# Patient Record
Sex: FEMALE | URBAN - METROPOLITAN AREA
[De-identification: names, ages, dates, MRNs, and addresses within clinical notes are randomized per-mention and may not be internally consistent; named-entity substitution may affect disease eponyms.]

---

## 2020-02-06 ENCOUNTER — OFFICE VISIT (OUTPATIENT)
Dept: URBAN - METROPOLITAN AREA CLINIC 52 | Facility: CLINIC | Age: 80
End: 2020-02-06
Payer: MEDICARE

## 2020-02-06 DIAGNOSIS — H40.1111 PRIMARY OPEN-ANGLE GLAUCOMA, RIGHT EYE, MILD STAGE: ICD-10-CM

## 2020-02-06 DIAGNOSIS — H52.4 PRESBYOPIA: ICD-10-CM

## 2020-02-06 PROCEDURE — 99213 OFFICE O/P EST LOW 20 MIN: CPT | Performed by: OPHTHALMOLOGY

## 2020-02-06 ASSESSMENT — INTRAOCULAR PRESSURE
OD: 14
OS: 12
OD: 18
OS: 14

## 2020-02-06 NOTE — IMPRESSION/PLAN
Impression: Primary open-angle glaucoma, left eye, severe stage: Y90.3993.  Plan: Pt VF shows some worsening, will repeat in April, if results are similar or worse will discuss changing gtts and/or seeing the glaucoma specialist. Pt to continue with Lumigan 1 gtt qhs OS and Combigan 1 gtt bid OU

## 2020-04-15 ENCOUNTER — OFFICE VISIT (OUTPATIENT)
Dept: URBAN - METROPOLITAN AREA CLINIC 52 | Facility: CLINIC | Age: 80
End: 2020-04-15
Payer: MEDICARE

## 2020-04-15 PROCEDURE — 92083 EXTENDED VISUAL FIELD XM: CPT | Performed by: OPTOMETRIST

## 2020-04-15 PROCEDURE — 92133 CPTRZD OPH DX IMG PST SGM ON: CPT | Performed by: OPTOMETRIST

## 2020-04-15 PROCEDURE — 92002 INTRM OPH EXAM NEW PATIENT: CPT | Performed by: OPTOMETRIST

## 2020-04-15 RX ORDER — NETARSUDIL AND LATANOPROST OPHTHALMIC SOLUTION, 0.02%/0.005% .2; .05 MG/ML; MG/ML
SOLUTION/ DROPS OPHTHALMIC; TOPICAL
Qty: 2.5 | Refills: 6 | Status: INACTIVE
Start: 2020-04-15 | End: 2021-02-19

## 2020-04-15 ASSESSMENT — INTRAOCULAR PRESSURE
OS: 13
OD: 15

## 2020-04-15 NOTE — IMPRESSION/PLAN
Impression: Primary open-angle glaucoma, left eye, severe stage: L07.2809. Plan: vf and oct shows progression. Continue Combigan bid ou, dc Lumigan and switch to Rocklatan q hs OS. Discussed SLT/micropulse.  COnsult with Dr. Tayo Peterson since VF progressed in 6 mos

## 2020-04-15 NOTE — IMPRESSION/PLAN
Impression: Primary open-angle glaucoma, right eye, mild stage: H40.1111.  Plan: continue Combigan bid ou

## 2020-04-30 ENCOUNTER — OFFICE VISIT (OUTPATIENT)
Dept: URBAN - METROPOLITAN AREA CLINIC 45 | Facility: CLINIC | Age: 80
End: 2020-04-30
Payer: MEDICARE

## 2020-04-30 DIAGNOSIS — H40.2212 CHRONIC ANGLE-CLOSURE GLAUCOMA, RIGHT EYE, MODERATE STAGE: ICD-10-CM

## 2020-04-30 PROCEDURE — 92020 GONIOSCOPY: CPT | Performed by: OPHTHALMOLOGY

## 2020-04-30 PROCEDURE — 92012 INTRM OPH EXAM EST PATIENT: CPT | Performed by: OPHTHALMOLOGY

## 2020-04-30 PROCEDURE — 76514 ECHO EXAM OF EYE THICKNESS: CPT | Performed by: OPHTHALMOLOGY

## 2020-04-30 RX ORDER — PREDNISOLONE ACETATE 10 MG/ML
1 % SUSPENSION/ DROPS OPHTHALMIC
Qty: 1 | Refills: 0 | Status: INACTIVE
Start: 2020-04-30 | End: 2020-08-27

## 2020-04-30 ASSESSMENT — INTRAOCULAR PRESSURE
OS: 11
OD: 10

## 2020-04-30 NOTE — IMPRESSION/PLAN
Impression: Chronic angle-closure glaucoma, left eye, severe stage: L97.3092. Goal IOP 12 and below, /522, 4/20 OCT 66/61 9/7, 4/20 HVF OD SNS OS DSA Plan: Discussed diagnosis with patient. Discussed treatment options. Recommend patient continue Rocklatan QHS OS and Combigan BID OU.  ok for LPI OU in ASC, RL2. Discussed narrow angles, risk of angle closure, symptoms of AACG and Laser peripheral iridotomy (LPI) risk/benefits/alternatives. Discussed that pt should avoid dilation and anti-depression, anti-anxiety, anti-histamine, or other medications contraindicated in angle closure glaucoma until the laser has been performed. Discussed risk of irreversible vision loss with glaucoma. Pt voiced understanding.

## 2020-05-18 ENCOUNTER — SURGERY (OUTPATIENT)
Dept: URBAN - METROPOLITAN AREA SURGERY 20 | Facility: SURGERY | Age: 80
End: 2020-05-18
Payer: MEDICARE

## 2020-05-26 ENCOUNTER — POST-OPERATIVE VISIT (OUTPATIENT)
Dept: URBAN - METROPOLITAN AREA CLINIC 45 | Facility: CLINIC | Age: 80
End: 2020-05-26

## 2020-05-26 DIAGNOSIS — Z09 ENCNTR FOR F/U EXAM AFT TRTMT FOR COND OTH THAN MALIG NEOPLM: Primary | ICD-10-CM

## 2020-05-26 PROCEDURE — 99024 POSTOP FOLLOW-UP VISIT: CPT | Performed by: OPTOMETRIST

## 2020-05-26 ASSESSMENT — INTRAOCULAR PRESSURE
OD: 12
OS: 10

## 2020-07-21 ENCOUNTER — OFFICE VISIT (OUTPATIENT)
Dept: URBAN - METROPOLITAN AREA CLINIC 52 | Facility: CLINIC | Age: 80
End: 2020-07-21
Payer: MEDICARE

## 2020-07-21 PROCEDURE — 99213 OFFICE O/P EST LOW 20 MIN: CPT | Performed by: OPHTHALMOLOGY

## 2020-07-21 ASSESSMENT — INTRAOCULAR PRESSURE
OS: 21
OD: 11
OD: 19
OS: 11

## 2020-07-21 NOTE — IMPRESSION/PLAN
Impression: Chronic angle-closure glaucoma, right eye, moderate stage: R49.3484. S/p LPI Plan: IOP is adequate will have pt continue with current regimen.

## 2020-07-21 NOTE — IMPRESSION/PLAN
Impression: Dry eye syndrome of bilateral lacrimal glands: H04.123. Plan: Recommend pt to use PF AFT during the day, recommended Refresh with Brandon pt denied samples, does not want to use something new. Also recommended pt to use genteal gel gtts QHS OU.

## 2020-07-21 NOTE — IMPRESSION/PLAN
Impression: Chronic angle-closure glaucoma, left eye, severe stage: Z65.4916. S/p LPI Plan: Pt is doing well with current regimen, IOP may be adequate, and pt is compliant. Recommend pt to continue with gtts. Explained to pt that irritation is not likely from Loletha Cola likely from dryness, as pt on Rx since April. Pt to continue with Rocklatan 1 gtt qhs OS and Combigan bid OU.   Will re-check VF in Nov, and not wait one year, due to spl heme

## 2020-08-27 ENCOUNTER — OFFICE VISIT (OUTPATIENT)
Dept: URBAN - METROPOLITAN AREA CLINIC 52 | Facility: CLINIC | Age: 80
End: 2020-08-27
Payer: MEDICARE

## 2020-08-27 DIAGNOSIS — H25.812 COMBINED FORMS OF AGE-RELATED CATARACT, LEFT EYE: ICD-10-CM

## 2020-08-27 DIAGNOSIS — H04.123 DRY EYE SYNDROME OF BILATERAL LACRIMAL GLANDS: ICD-10-CM

## 2020-08-27 DIAGNOSIS — H40.2223 CHRONIC ANGLE-CLOSURE GLAUCOMA, LEFT EYE, SEVERE STAGE: Primary | ICD-10-CM

## 2020-08-27 PROCEDURE — 99213 OFFICE O/P EST LOW 20 MIN: CPT | Performed by: OPHTHALMOLOGY

## 2020-08-27 ASSESSMENT — INTRAOCULAR PRESSURE
OS: 9
OS: 12
OD: 12
OD: 11

## 2020-08-27 NOTE — IMPRESSION/PLAN
Impression: Chronic angle-closure glaucoma, left eye, severe stage: W83.8637. S/p LPI OU 
IOP 12 9   CCT  534 522 Plan: IOP is at Target, discussed alternative regimens, recommend current regimen. Combigan 1 gtt bid OU and Rocklatan 1 gtt QHS OS. Also recommend pt to continue with AFT in between gtts (5-10 minutes) for comfort.

## 2020-08-27 NOTE — IMPRESSION/PLAN
Impression: Chronic angle-closure glaucoma, right eye, moderate stage: S28.8978. 
S/p LPI OU Plan: See plan 1

## 2020-08-27 NOTE — IMPRESSION/PLAN
Impression: Dry eye syndrome of bilateral lacrimal glands: H04.123. Plan: Discussed diagnosis in detail with patient. Discussed treatment options with patient. Patient instructed to apply warm compresses. Patient instructed to use artificial tears as needed.

## 2020-11-04 ENCOUNTER — TESTING ONLY (OUTPATIENT)
Dept: URBAN - METROPOLITAN AREA CLINIC 52 | Facility: CLINIC | Age: 80
End: 2020-11-04
Payer: MEDICARE

## 2020-11-04 PROCEDURE — 92083 EXTENDED VISUAL FIELD XM: CPT | Performed by: OPHTHALMOLOGY

## 2020-11-20 ENCOUNTER — OFFICE VISIT (OUTPATIENT)
Dept: URBAN - METROPOLITAN AREA CLINIC 52 | Facility: CLINIC | Age: 80
End: 2020-11-20
Payer: MEDICARE

## 2020-11-20 PROCEDURE — 99213 OFFICE O/P EST LOW 20 MIN: CPT | Performed by: OPHTHALMOLOGY

## 2020-11-20 ASSESSMENT — INTRAOCULAR PRESSURE
OS: 9
OD: 12

## 2020-11-20 NOTE — IMPRESSION/PLAN
Impression: Chronic angle-closure glaucoma, left eye, severe stage: Z77.9899. CCT: 534/522         S/P LPI OU Plan: IOP doing well on Combigan OU BID and Rocklatan OS QHS - continue on current regimen. Will have Dr. Rossi Forward review patient's notes and testing and see if any additional treatment is warranted. Will call patient once we have spoken to Dr. Maren Burns. Patient understands. Patient to call us sooner, onset of new visual changes.

## 2021-02-18 ENCOUNTER — OFFICE VISIT (OUTPATIENT)
Dept: URBAN - METROPOLITAN AREA CLINIC 52 | Facility: CLINIC | Age: 81
End: 2021-02-18
Payer: MEDICARE

## 2021-02-18 DIAGNOSIS — H40.1123 PRIMARY OPEN-ANGLE GLAUCOMA, LEFT EYE, SEVERE STAGE: Primary | ICD-10-CM

## 2021-02-18 DIAGNOSIS — H25.13 AGE-RELATED NUCLEAR CATARACT, BILATERAL: ICD-10-CM

## 2021-02-18 PROCEDURE — 99213 OFFICE O/P EST LOW 20 MIN: CPT | Performed by: OPHTHALMOLOGY

## 2021-02-18 ASSESSMENT — INTRAOCULAR PRESSURE
OS: 8
OS: 9
OD: 10

## 2021-02-19 RX ORDER — NETARSUDIL AND LATANOPROST OPHTHALMIC SOLUTION, 0.02%/0.005% .2; .05 MG/ML; MG/ML
SOLUTION/ DROPS OPHTHALMIC; TOPICAL
Qty: 2.5 | Refills: 6 | Status: INACTIVE
Start: 2021-02-19 | End: 2021-07-15

## 2021-02-19 RX ORDER — BRIMONIDINE TARTRATE, TIMOLOL MALEATE 2; 5 MG/ML; MG/ML
SOLUTION/ DROPS OPHTHALMIC
Qty: 15 | Refills: 3 | Status: INACTIVE
Start: 2021-02-19 | End: 2021-06-03

## 2021-02-19 NOTE — IMPRESSION/PLAN
Impression: Primary open-angle glaucoma, left eye, severe stage: D83.7481. CCT: 534/522         S/P LPI OU Plan: IOP doing well on Combigan OU BID and Rocklatan OS QHS - continue on current regimen. Will continue to monitor IOP closely. Patient to call us sooner, onset of new visual changes. Will dilate patient at next visit.

## 2021-02-19 NOTE — IMPRESSION/PLAN
Impression: Age-related nuclear cataract, bilateral: H25.13. Plan: Cataracts account for patients complaints. Patient desires to hold off on cataract surgery at this time.

## 2021-04-21 ENCOUNTER — TESTING ONLY (OUTPATIENT)
Dept: URBAN - METROPOLITAN AREA CLINIC 52 | Facility: CLINIC | Age: 81
End: 2021-04-21
Payer: MEDICARE

## 2021-04-21 PROCEDURE — 76519 ECHO EXAM OF EYE: CPT | Performed by: OPHTHALMOLOGY

## 2021-04-21 PROCEDURE — 92025 CPTRIZED CORNEAL TOPOGRAPHY: CPT | Performed by: OPHTHALMOLOGY

## 2021-04-21 ASSESSMENT — PACHYMETRY
OS: 2.85
OS: 23.61
OD: 2.90
OD: 23.60

## 2021-04-23 ENCOUNTER — OFFICE VISIT (OUTPATIENT)
Dept: URBAN - METROPOLITAN AREA CLINIC 52 | Facility: CLINIC | Age: 81
End: 2021-04-23
Payer: MEDICARE

## 2021-04-23 PROCEDURE — 99213 OFFICE O/P EST LOW 20 MIN: CPT | Performed by: OPHTHALMOLOGY

## 2021-04-23 RX ORDER — PREDNISOLONE ACETATE 10 MG/ML
1 % SUSPENSION/ DROPS OPHTHALMIC
Qty: 10 | Refills: 1 | Status: INACTIVE
Start: 2021-04-23 | End: 2021-05-05

## 2021-04-23 RX ORDER — KETOROLAC TROMETHAMINE 5 MG/ML
0.5 % SOLUTION/ DROPS OPHTHALMIC
Qty: 5 | Refills: 1 | Status: INACTIVE
Start: 2021-04-23 | End: 2021-05-20

## 2021-04-23 ASSESSMENT — INTRAOCULAR PRESSURE
OD: 12
OS: 9

## 2021-04-23 ASSESSMENT — VISUAL ACUITY
OD: 20/30
OS: 20/40

## 2021-04-23 NOTE — IMPRESSION/PLAN
Impression: Primary open-angle glaucoma, left eye, severe stage: I13.6103. CCT: 534/522         S/P LPI OU Plan: IOP doing well on Combigan OU BID and Rocklatan OS QHS - continue on current regimen. Will monitor after cataract surgery.

## 2021-04-23 NOTE — IMPRESSION/PLAN
Impression: Age-related nuclear cataract, bilateral: H25.13. Plan: OK for ce/iol OS in [facility] 27, RL2. AIM FAR. Cataracts are limiting vision. Discussed risks, benefits and alternatives to surgery including but not limited to: bleeding, infection, risk of vision loss, loss of the eye, need for other surgery. Patient voiced understanding and wishes to proceed. No Dexy secondary to glaucoma status in the left eye. We can do Dexy in the right eye. Will do Moxi injection at the time of surgery. Recommend SA60WF +22. 0

## 2021-05-04 ENCOUNTER — SURGERY (OUTPATIENT)
Dept: URBAN - METROPOLITAN AREA SURGERY 20 | Facility: SURGERY | Age: 81
End: 2021-05-04
Payer: MEDICARE

## 2021-05-04 PROCEDURE — 66984 XCAPSL CTRC RMVL W/O ECP: CPT | Performed by: OPHTHALMOLOGY

## 2021-05-06 ENCOUNTER — POST-OPERATIVE VISIT (OUTPATIENT)
Dept: URBAN - METROPOLITAN AREA CLINIC 52 | Facility: CLINIC | Age: 81
End: 2021-05-06

## 2021-05-06 PROCEDURE — 99024 POSTOP FOLLOW-UP VISIT: CPT | Performed by: OPHTHALMOLOGY

## 2021-05-06 RX ORDER — OFLOXACIN 3 MG/ML
0.3 % SOLUTION/ DROPS OPHTHALMIC
Qty: 5 | Refills: 1 | Status: INACTIVE
Start: 2021-05-06 | End: 2021-05-20

## 2021-05-06 ASSESSMENT — INTRAOCULAR PRESSURE
OS: 17
OS: 14
OD: 20

## 2021-05-06 NOTE — IMPRESSION/PLAN
Impression: S/P Cataract Extraction by phacoemulsification with IOL placement OS - 2 Days. Encounter for surgical aftercare following surgery on a sense organ  Z48.810. Excellent post op course   Post operative instructions reviewed - Condition is improving - Plan: Patient is healing well. Patient to call us sooner with any questions or concerns. Continue Combigan OU BID and Rocklatan OS QHS. --Continue Ofloxacin 0.3%--Continue Ketorolac 0.5%--Taper Prednisolone acetate 1% QID x 1 wk, TID x 1 wk, BID x 1wk, QD x 1wk, then d/c--Advised patient to use artificial tears for comfort.

## 2021-05-13 ENCOUNTER — OFFICE VISIT (OUTPATIENT)
Dept: URBAN - METROPOLITAN AREA CLINIC 52 | Facility: CLINIC | Age: 81
End: 2021-05-13
Payer: MEDICARE

## 2021-05-13 DIAGNOSIS — H25.11 AGE-RELATED NUCLEAR CATARACT, RIGHT EYE: ICD-10-CM

## 2021-05-13 DIAGNOSIS — Z48.810 ENCOUNTER FOR SURGICAL AFTERCARE FOLLOWING SURGERY ON A SENSE ORGAN: Primary | ICD-10-CM

## 2021-05-13 PROCEDURE — 99024 POSTOP FOLLOW-UP VISIT: CPT | Performed by: OPHTHALMOLOGY

## 2021-05-13 ASSESSMENT — INTRAOCULAR PRESSURE
OS: 14
OD: 11
OS: 7

## 2021-05-13 ASSESSMENT — VISUAL ACUITY: OD: 20/30

## 2021-05-13 NOTE — IMPRESSION/PLAN
Impression: S/P Cataract Extraction by phacoemulsification with IOL placement OS - 9 Days. Encounter for surgical aftercare following surgery on a sense organ  Z48.810. Excellent post op course   Post operative instructions reviewed - Condition is improving - Plan: OK for CE/IOL OD  in ASC, RL2. Distance target. Discussed need for glasses for near vision with standard. Discussed diagnosis of cataracts. Cataracts are limiting vision. Discussed risks, benefits and alternatives to surgery including but not limited to: bleeding, infection, risk of vision loss, loss of the eye, need for other surgery. Patient voiced understanding and wishes to proceed. Discussed doing Dexycu at the time of surgery. Use SA60WF +22. Robin Sintia 5 --Advised patient to use artificial tears for comfort.

## 2021-05-18 ENCOUNTER — SURGERY (OUTPATIENT)
Dept: URBAN - METROPOLITAN AREA SURGERY 20 | Facility: SURGERY | Age: 81
End: 2021-05-18
Payer: MEDICARE

## 2021-05-18 PROCEDURE — 66984 XCAPSL CTRC RMVL W/O ECP: CPT | Performed by: OPHTHALMOLOGY

## 2021-05-20 ENCOUNTER — POST-OPERATIVE VISIT (OUTPATIENT)
Dept: URBAN - METROPOLITAN AREA CLINIC 52 | Facility: CLINIC | Age: 81
End: 2021-05-20
Payer: MEDICARE

## 2021-05-20 PROCEDURE — 99024 POSTOP FOLLOW-UP VISIT: CPT | Performed by: OPHTHALMOLOGY

## 2021-05-20 ASSESSMENT — INTRAOCULAR PRESSURE
OS: 8
OD: 10

## 2021-05-25 NOTE — IMPRESSION/PLAN
Impression: S/P Cataract Extraction by phaco w/IOL placement; Dexycu SA60WF 22.50 OD - 2 Days. Presence of intraocular lens  Z96.1. Excellent post op course   Post operative instructions reviewed - Condition is improving - Plan: Patient is healing well. Patient to call us sooner with any questions or concerns. --Advised patient to use artificial tears for comfort.

## 2021-06-03 ENCOUNTER — POST-OPERATIVE VISIT (OUTPATIENT)
Dept: URBAN - METROPOLITAN AREA CLINIC 52 | Facility: CLINIC | Age: 81
End: 2021-06-03
Payer: MEDICARE

## 2021-06-03 PROCEDURE — 99024 POSTOP FOLLOW-UP VISIT: CPT | Performed by: OPHTHALMOLOGY

## 2021-06-03 RX ORDER — BRIMONIDINE TARTRATE, TIMOLOL MALEATE 2; 5 MG/ML; MG/ML
SOLUTION/ DROPS OPHTHALMIC
Qty: 15 | Refills: 3 | Status: INACTIVE
Start: 2021-06-03 | End: 2021-07-15

## 2021-06-03 ASSESSMENT — INTRAOCULAR PRESSURE
OD: 10
OD: 6
OS: 7
OS: 10

## 2021-06-03 NOTE — IMPRESSION/PLAN
Impression: S/P Cataract Extraction by phacoemulsification with IOL placement; Dexycu OD - 16 Days. Presence of intraocular lens  Z96.1. Excellent post op course   Post operative instructions reviewed - Condition is improving - Plan: IOP significantly improved since cataract surgery. Will have patient discontinue Rocklatan and continue with Combigan OU BID as directed. Patient understands and agrees with plan. --Advised patient to use artificial tears for comfort.

## 2021-06-18 ENCOUNTER — POST-OPERATIVE VISIT (OUTPATIENT)
Dept: URBAN - METROPOLITAN AREA CLINIC 52 | Facility: CLINIC | Age: 81
End: 2021-06-18
Payer: MEDICARE

## 2021-06-18 PROCEDURE — 99024 POSTOP FOLLOW-UP VISIT: CPT | Performed by: OPHTHALMOLOGY

## 2021-06-18 ASSESSMENT — INTRAOCULAR PRESSURE
OS: 11
OD: 11

## 2021-06-18 NOTE — IMPRESSION/PLAN
Impression:  Presence of intraocular lens  Z96.1. Excellent post op course   Post operative instructions reviewed - Plan: Discussed, in detail, and reassured patient about diagnosis. Discussed treatment options with patient, though no treatment is required/recommend at this time. Discussed signs and symptoms of PVD/floaters. Discussed signs and symptoms of retinal detachment. Will continue to observe for any changes. Return in 6weeks for a follow up.

## 2021-07-15 ENCOUNTER — POST-OPERATIVE VISIT (OUTPATIENT)
Dept: URBAN - METROPOLITAN AREA CLINIC 52 | Facility: CLINIC | Age: 81
End: 2021-07-15
Payer: MEDICARE

## 2021-07-15 ENCOUNTER — OFFICE VISIT (OUTPATIENT)
Dept: URBAN - METROPOLITAN AREA CLINIC 54 | Facility: CLINIC | Age: 81
End: 2021-07-15
Payer: MEDICARE

## 2021-07-15 DIAGNOSIS — H43.813 VITREOUS DEGENERATION, BILATERAL: Primary | ICD-10-CM

## 2021-07-15 DIAGNOSIS — H40.9 UNSPECIFIED GLAUCOMA: ICD-10-CM

## 2021-07-15 DIAGNOSIS — H43.11 VITREOUS HEMORRHAGE, RIGHT EYE: ICD-10-CM

## 2021-07-15 DIAGNOSIS — Z96.1 PRESENCE OF INTRAOCULAR LENS: Primary | ICD-10-CM

## 2021-07-15 PROCEDURE — 99204 OFFICE O/P NEW MOD 45 MIN: CPT | Performed by: OPHTHALMOLOGY

## 2021-07-15 PROCEDURE — 92201 OPSCPY EXTND RTA DRAW UNI/BI: CPT | Performed by: OPHTHALMOLOGY

## 2021-07-15 PROCEDURE — 99024 POSTOP FOLLOW-UP VISIT: CPT | Performed by: OPHTHALMOLOGY

## 2021-07-15 RX ORDER — DORZOLAMIDE HYDROCHLORIDE AND TIMOLOL MALEATE 20; 5 MG/ML; MG/ML
SOLUTION/ DROPS OPHTHALMIC
Qty: 5 | Refills: 4 | Status: INACTIVE
Start: 2021-07-15 | End: 2021-07-15

## 2021-07-15 ASSESSMENT — INTRAOCULAR PRESSURE
OD: 11
OD: 14
OS: 14
OS: 13
OD: 15
OS: 14
OS: 13
OD: 11
OS: 10
OS: 10
OD: 15
OD: 14

## 2021-07-15 NOTE — IMPRESSION/PLAN
Impression: S/P Cataract Extraction by phacoemulsification with IOL placement; Dexycu OD - 58 Days. Presence of intraocular lens  Z96.1. Excellent post op course   Post operative instructions reviewed - Condition is improving - Plan: Splinter heme noted on her left eye today. Will have patient discontinue Combigan and start Dorzolamide/Timolol OU BID - ERx'd into the pharmacy. Discussed PVD with patient, patient is extremely symptomatic with floaters OU. Discussed retinal heme OD with patient. Will refer patient to retinal specialist for eval. Will have patient make an appointment for next week with [facility]. Patient understands and agrees with plan. --Advised patient to use artificial tears for comfort.

## 2021-07-15 NOTE — IMPRESSION/PLAN
Impression: Vitreous hemorrhage, right eye: H43.11 Right. Plan: Discussed options of surgery vs observation. Patient elects to observe.

## 2021-07-15 NOTE — IMPRESSION/PLAN
Impression: Vitreous degeneration, bilateral: H43.813 Bilateral.

OCT:
OD: wnl OS: wnl Plan: Indirect ophthalmoscopy with scleral depression was performed and no retinal breaks or evidence of detachment were identified. The diagnosis, natural history, and prognosis of PVD were discussed at length. The signs and symptoms of retinal break/detachment including increased flashes, new-onset floaters, and development of a shadow/curtain shade in the visual field were reviewed.  

RTC 6-8 weeks DFE OD reeval

## 2021-07-30 ENCOUNTER — POST-OPERATIVE VISIT (OUTPATIENT)
Dept: URBAN - METROPOLITAN AREA CLINIC 52 | Facility: CLINIC | Age: 81
End: 2021-07-30
Payer: MEDICARE

## 2021-07-30 PROCEDURE — 99024 POSTOP FOLLOW-UP VISIT: CPT | Performed by: OPHTHALMOLOGY

## 2021-07-30 ASSESSMENT — INTRAOCULAR PRESSURE
OD: 10
OS: 10
OD: 12
OS: 13

## 2021-07-30 NOTE — IMPRESSION/PLAN
Impression: S/P Cataract Extraction by phacoemulsification with IOL placement; Dexycu OD - 73 Days. Presence of intraocular lens  Z96.1. Excellent post op course   Post operative instructions reviewed - Condition is improving - Plan: Per patient, floaters are improving. Continue with Cosopt OU BID. --Advised patient to use artificial tears for comfort.

## 2021-11-17 ENCOUNTER — TESTING ONLY (OUTPATIENT)
Dept: URBAN - METROPOLITAN AREA CLINIC 52 | Facility: CLINIC | Age: 81
End: 2021-11-17
Payer: MEDICARE

## 2021-11-17 PROCEDURE — 92083 EXTENDED VISUAL FIELD XM: CPT | Performed by: OPHTHALMOLOGY

## 2021-12-16 ENCOUNTER — OFFICE VISIT (OUTPATIENT)
Dept: URBAN - METROPOLITAN AREA CLINIC 52 | Facility: CLINIC | Age: 81
End: 2021-12-16
Payer: MEDICARE

## 2021-12-16 DIAGNOSIS — H40.1112 PRIMARY OPEN-ANGLE GLAUCOMA, RIGHT EYE, MODERATE STAGE: ICD-10-CM

## 2021-12-16 PROCEDURE — 99213 OFFICE O/P EST LOW 20 MIN: CPT | Performed by: OPHTHALMOLOGY

## 2021-12-16 PROCEDURE — 92083 EXTENDED VISUAL FIELD XM: CPT | Performed by: OPHTHALMOLOGY

## 2021-12-16 ASSESSMENT — INTRAOCULAR PRESSURE
OD: 11
OD: 13
OS: 11
OS: 12

## 2021-12-16 NOTE — IMPRESSION/PLAN
Impression: Primary open-angle glaucoma, left eye, severe stage: C30.9355. CCT: 534/522         S/P LPI OU Plan: IOP doing well on Cosopt OU BID - continue on current regimen. Reviewed VF with patient that was done in November. Will continue to monitor closely. Patient to call us sooner, onset of new visual changes. 

FINAL MEDS: COSOPT OU BID

## 2022-04-20 ENCOUNTER — OFFICE VISIT (OUTPATIENT)
Dept: URBAN - METROPOLITAN AREA CLINIC 52 | Facility: CLINIC | Age: 82
End: 2022-04-20
Payer: MEDICARE

## 2022-04-20 DIAGNOSIS — H40.9 UNSPECIFIED GLAUCOMA: ICD-10-CM

## 2022-04-20 DIAGNOSIS — H04.123 DRY EYE SYNDROME OF BILATERAL LACRIMAL GLANDS: Primary | ICD-10-CM

## 2022-04-20 DIAGNOSIS — H43.813 VITREOUS DEGENERATION, BILATERAL: ICD-10-CM

## 2022-04-20 PROCEDURE — 92134 CPTRZ OPH DX IMG PST SGM RTA: CPT | Performed by: OPTOMETRIST

## 2022-04-20 PROCEDURE — 92014 COMPRE OPH EXAM EST PT 1/>: CPT | Performed by: OPTOMETRIST

## 2022-04-20 ASSESSMENT — INTRAOCULAR PRESSURE
OS: 12
OD: 12
OS: 13

## 2022-04-20 NOTE — IMPRESSION/PLAN
Impression: Dry eye syndrome of bilateral lacrimal glands: H04.123. Plan: Patient has significant PEE OS>OD which likely correlates with patient's visual disturbances. NO evidence of macular edema, macular changes, central PCO, etc. 

Advised patient to increase usage of PFAT to Q2hrs OU throughout the day, WC BID OU 5-10 minutes, and follow with Dr. Darren Su accordingly.

## 2022-04-20 NOTE — IMPRESSION/PLAN
Impression: Unspecified glaucoma: H40.9. Plan: IOP appears well-controlled with Cosopt BID OU. Advised patient to continue gtts, patient scheduled in June with Dr. Cara Lopez, would advise sooner visit given patient's vision changes. Mode rodriguez noted OS today, possible progression of glaucoma. **Unclear whether patient has chronic angle-closure glaucoma or POAG as some previous notes from Dr. Cara Lopez Dx chronic angle-closure glaucoma and some Dx POAG.

## 2022-05-02 ENCOUNTER — OFFICE VISIT (OUTPATIENT)
Dept: URBAN - METROPOLITAN AREA CLINIC 52 | Facility: CLINIC | Age: 82
End: 2022-05-02
Payer: MEDICARE

## 2022-05-02 DIAGNOSIS — H40.1123 PRIMARY OPEN-ANGLE GLAUCOMA, LEFT EYE, SEVERE STAGE: Primary | ICD-10-CM

## 2022-05-02 PROCEDURE — 99213 OFFICE O/P EST LOW 20 MIN: CPT | Performed by: OPHTHALMOLOGY

## 2022-05-02 RX ORDER — BIMATOPROST 0.3 MG/ML
0.03 % SOLUTION/ DROPS OPHTHALMIC
Qty: 10 | Refills: 3 | Status: INACTIVE
Start: 2022-05-02 | End: 2022-05-04

## 2022-05-02 ASSESSMENT — INTRAOCULAR PRESSURE
OS: 15
OS: 12
OD: 14
OD: 13

## 2022-05-02 NOTE — IMPRESSION/PLAN
Impression: Primary open-angle glaucoma, left eye, severe stage: F08.8880. CCT: 534/522         S/P LPI OU Plan: IOP slowly elevating and VF slightly worse on Cosopt OU BID - recommended change to regimen. ERxed Bimatoprost QHS OS Only. Reviewed VF with patient that was done in November. Will continue to monitor closely. Patient to call us sooner, onset of new visual changes. 

FINAL MEDS: COSOPT OU BID and BIMATOPROST QHS OS Only

## 2022-06-23 ENCOUNTER — OFFICE VISIT (OUTPATIENT)
Dept: URBAN - METROPOLITAN AREA CLINIC 52 | Facility: CLINIC | Age: 82
End: 2022-06-23
Payer: MEDICARE

## 2022-06-23 DIAGNOSIS — H40.1123 PRIMARY OPEN-ANGLE GLAUCOMA, LEFT EYE, SEVERE STAGE: Primary | ICD-10-CM

## 2022-06-23 PROCEDURE — 99214 OFFICE O/P EST MOD 30 MIN: CPT | Performed by: OPHTHALMOLOGY

## 2022-06-23 RX ORDER — DORZOLAMIDE HYDROCHLORIDE AND TIMOLOL MALEATE 20; 5 MG/ML; MG/ML
SOLUTION/ DROPS OPHTHALMIC
Qty: 7.5 | Refills: 1 | Status: ACTIVE
Start: 2022-06-23

## 2022-06-23 RX ORDER — BIMATOPROST 0.3 MG/ML
0.03 % SOLUTION/ DROPS OPHTHALMIC
Qty: 10 | Refills: 3 | Status: ACTIVE
Start: 2022-06-23

## 2022-06-23 RX ORDER — DORZOLAMIDE HYDROCHLORIDE AND TIMOLOL MALEATE 20; 5 MG/ML; MG/ML
SOLUTION/ DROPS OPHTHALMIC
Qty: 7.5 | Refills: 1 | Status: INACTIVE
Start: 2022-06-23 | End: 2022-06-23

## 2022-06-23 ASSESSMENT — INTRAOCULAR PRESSURE
OD: 11
OD: 12
OS: 9
OS: 10

## 2022-06-23 NOTE — IMPRESSION/PLAN
Impression: Primary open-angle glaucoma, left eye, severe stage: E62.5810. CCT: 534/522         S/P LPI OU Plan: IOP is holding well, using Dorzolamide / Timolol OU BID, and Bimatoprost QHS OS only  Will continue to monitor closely. Patient to call us sooner, onset of new visual changes. Erxed to patients pharmacy FINAL MEDS: COSOPT OU BID and BIMATOPROST QHS OS Only

## 2022-08-04 ENCOUNTER — OFFICE VISIT (OUTPATIENT)
Dept: URBAN - METROPOLITAN AREA CLINIC 52 | Facility: CLINIC | Age: 82
End: 2022-08-04
Payer: MEDICARE

## 2022-08-04 DIAGNOSIS — H40.1123 PRIMARY OPEN-ANGLE GLAUCOMA, LEFT EYE, SEVERE STAGE: Primary | ICD-10-CM

## 2022-08-04 DIAGNOSIS — H40.1112 PRIMARY OPEN-ANGLE GLAUCOMA, RIGHT EYE, MODERATE STAGE: ICD-10-CM

## 2022-08-04 PROCEDURE — 99213 OFFICE O/P EST LOW 20 MIN: CPT | Performed by: OPHTHALMOLOGY

## 2022-08-04 ASSESSMENT — INTRAOCULAR PRESSURE
OS: 9
OS: 12
OD: 11
OD: 12

## 2022-08-04 NOTE — IMPRESSION/PLAN
Impression: Primary open-angle glaucoma, right eye, moderate stage: H40.1112.  Plan: Continue same drops observe

## 2022-08-04 NOTE — IMPRESSION/PLAN
Impression: Primary open-angle glaucoma, left eye, severe stage: A03.7977.  Plan: continue same Drop  regimen  vf-24.2 in november

## 2022-11-23 ENCOUNTER — TESTING ONLY (OUTPATIENT)
Dept: URBAN - METROPOLITAN AREA CLINIC 52 | Facility: CLINIC | Age: 82
End: 2022-11-23
Payer: MEDICARE

## 2022-11-23 DIAGNOSIS — H40.1112 PRIMARY OPEN-ANGLE GLAUCOMA, RIGHT EYE, MODERATE STAGE: Primary | ICD-10-CM

## 2022-11-23 PROCEDURE — 92083 EXTENDED VISUAL FIELD XM: CPT | Performed by: OPHTHALMOLOGY

## 2022-12-08 ENCOUNTER — OFFICE VISIT (OUTPATIENT)
Dept: URBAN - METROPOLITAN AREA CLINIC 52 | Facility: CLINIC | Age: 82
End: 2022-12-08
Payer: MEDICARE

## 2022-12-08 DIAGNOSIS — H40.1112 PRIMARY OPEN-ANGLE GLAUCOMA, RIGHT EYE, MODERATE STAGE: ICD-10-CM

## 2022-12-08 DIAGNOSIS — H40.1123 PRIMARY OPEN-ANGLE GLAUCOMA, LEFT EYE, SEVERE STAGE: Primary | ICD-10-CM

## 2022-12-08 PROCEDURE — 99213 OFFICE O/P EST LOW 20 MIN: CPT | Performed by: OPHTHALMOLOGY

## 2022-12-08 ASSESSMENT — INTRAOCULAR PRESSURE
OS: 13
OD: 15

## 2022-12-08 NOTE — IMPRESSION/PLAN
Impression: Primary open-angle glaucoma, right eye, moderate stage: H40.1112.  Plan: Continue to observe

## 2022-12-08 NOTE — IMPRESSION/PLAN
Impression: Primary open-angle glaucoma, left eye, severe stage: W81.3427. CCT: 534/522         S/P LPI OU Plan: IOP is okay, using Dorzolamide / Timolol OU BID, and Bimatoprost QHS OS only; but Splinter heme present  on exam today OS, Pt. c/o irritation OS today. Will continue to monitor closely. Patient to call us sooner, onset of new visual changes. Pt. to D/C Bimontaprost and start Vyzulta QHS OS (sample given) FINAL MEDS: COSOPT OU BID and Vyzulta QHS OS Only

## 2023-01-19 ENCOUNTER — OFFICE VISIT (OUTPATIENT)
Dept: URBAN - METROPOLITAN AREA CLINIC 52 | Facility: CLINIC | Age: 83
End: 2023-01-19
Payer: MEDICARE

## 2023-01-19 DIAGNOSIS — H40.1123 PRIMARY OPEN-ANGLE GLAUCOMA, LEFT EYE, SEVERE STAGE: Primary | ICD-10-CM

## 2023-01-19 DIAGNOSIS — H40.1112 PRIMARY OPEN-ANGLE GLAUCOMA, RIGHT EYE, MODERATE STAGE: ICD-10-CM

## 2023-01-19 PROCEDURE — 99213 OFFICE O/P EST LOW 20 MIN: CPT | Performed by: OPHTHALMOLOGY

## 2023-01-19 ASSESSMENT — INTRAOCULAR PRESSURE
OD: 16
OD: 12
OS: 12
OS: 16

## 2023-02-24 ENCOUNTER — OFFICE VISIT (OUTPATIENT)
Dept: URBAN - METROPOLITAN AREA CLINIC 51 | Facility: CLINIC | Age: 83
End: 2023-02-24
Payer: MEDICARE

## 2023-02-24 DIAGNOSIS — H04.123 DRY EYE SYNDROME OF BILATERAL LACRIMAL GLANDS: ICD-10-CM

## 2023-02-24 DIAGNOSIS — H40.1212 LOW-TENSION GLAUCOMA, RIGHT EYE, MODERATE STAGE: ICD-10-CM

## 2023-02-24 DIAGNOSIS — H40.1223 LOW-TENSION GLAUCOMA, LEFT EYE, SEVERE STAGE: Primary | ICD-10-CM

## 2023-02-24 PROCEDURE — 92020 GONIOSCOPY: CPT | Performed by: STUDENT IN AN ORGANIZED HEALTH CARE EDUCATION/TRAINING PROGRAM

## 2023-02-24 PROCEDURE — 92133 CPTRZD OPH DX IMG PST SGM ON: CPT | Performed by: STUDENT IN AN ORGANIZED HEALTH CARE EDUCATION/TRAINING PROGRAM

## 2023-02-24 PROCEDURE — 99204 OFFICE O/P NEW MOD 45 MIN: CPT | Performed by: STUDENT IN AN ORGANIZED HEALTH CARE EDUCATION/TRAINING PROGRAM

## 2023-02-24 RX ORDER — NETARSUDIL 0.2 MG/ML
0.02 % SOLUTION/ DROPS OPHTHALMIC; TOPICAL
Qty: 2.5 | Refills: 5 | Status: ACTIVE
Start: 2023-02-24

## 2023-02-24 RX ORDER — DORZOLAMIDE HYDROCHLORIDE AND TIMOLOL MALEATE 20; 5 MG/ML; MG/ML
SOLUTION/ DROPS OPHTHALMIC
Qty: 10 | Refills: 5 | Status: ACTIVE
Start: 2023-02-24

## 2023-02-24 RX ORDER — LATANOPROSTENE BUNOD 0.24 MG/ML
0.024 % SOLUTION/ DROPS OPHTHALMIC
Qty: 5 | Refills: 5 | Status: ACTIVE
Start: 2023-02-24

## 2023-02-24 ASSESSMENT — INTRAOCULAR PRESSURE
OD: 13
OS: 12

## 2023-02-24 NOTE — IMPRESSION/PLAN
Impression: Low-tension glaucoma, left eye, severe stage: K91.4269. Plan: Ocular Surgical History: s/p IOL OU, LPI OU Pachy: 534/522 Tmax: 20/21 Target IOP: 10 mm HG OU Med Allergies: none Heart / Lung / Kidney disease/sulfa allergy: Pt. denies Gonioscopy: grade 4, 2+ TMP OU
OCT: 59 inf thinning/37 diffuse thinning-artifact HVF: OD: MD -5 SI arc/sup step/OS: MD -16 sup alt. inf arc to fixation C/D: .7/.8 Better seeing eye: OS
IOP Today: 13/12 Plan: IOP is great today, but VF's are worse and disc heme present OS. Recommend lowering IOP. Discussed adding Rhopressa, SLT, and cautiously watching as patient seems hesitant. Patient would like to start drop. Drops: START Rhopressa QHS OU, CONTINUE Vyzulta QHS OS, and Cosopt BID OU Discussed natural history of glaucoma and that irreversible vision loss can occur without adequate IOP control. Emphasized compliance with eyedrops and other treatment described above.

## 2023-06-16 ENCOUNTER — OFFICE VISIT (OUTPATIENT)
Dept: URBAN - METROPOLITAN AREA CLINIC 51 | Facility: CLINIC | Age: 83
End: 2023-06-16
Payer: MEDICARE

## 2023-06-16 DIAGNOSIS — H40.1212 LOW-TENSION GLAUCOMA, RIGHT EYE, MODERATE STAGE: ICD-10-CM

## 2023-06-16 DIAGNOSIS — H04.123 DRY EYE SYNDROME OF BILATERAL LACRIMAL GLANDS: ICD-10-CM

## 2023-06-16 DIAGNOSIS — H40.1223 LOW-TENSION GLAUCOMA, LEFT EYE, SEVERE STAGE: Primary | ICD-10-CM

## 2023-06-16 PROCEDURE — 99213 OFFICE O/P EST LOW 20 MIN: CPT | Performed by: STUDENT IN AN ORGANIZED HEALTH CARE EDUCATION/TRAINING PROGRAM

## 2023-06-16 ASSESSMENT — INTRAOCULAR PRESSURE
OD: 11
OS: 10

## 2023-06-16 NOTE — IMPRESSION/PLAN
Impression: Dry eye syndrome of bilateral lacrimal glands: H04.123. Plan: Patient to cont AFT OU for dryness and irritation.

## 2023-06-16 NOTE — IMPRESSION/PLAN
Impression: Low-tension glaucoma, left eye, severe stage: E13.2306. Plan: Ocular Surgical History: s/p IOL OU, LPI OU Pachy: 534/522 Tmax: 20/21 Target IOP: 10 mm HG OU Med Allergies: none Heart / Lung / Kidney disease/sulfa allergy: Pt. denies Gonioscopy: grade 4, 2+ TMP OU
OCT: 61 inf thinning/37 diffuse thinning-artifact HVF: OD: MD -5 SI arc/sup step/OS: MD -16 sup alt. inf arc to fixation C/D: .7/.8 Better seeing eye: OS
IOP Today: 11/10 Plan: IOP is lower OU, at goal OS. Recommend changing Vyzulta to OU to reach goal OD. Will obtain HVF at next visit, if any progression, then may consider SLT. Will continue to monitor. Drops: CHANGE  Vyzulta QHS OS
CONTINUE Rhopressa QHS OU and Cosopt BID OU Discussed natural history of glaucoma and that irreversible vision loss can occur without adequate IOP control. Emphasized compliance with eyedrops and other treatment described above.

## 2023-09-07 ENCOUNTER — OFFICE VISIT (OUTPATIENT)
Dept: URBAN - METROPOLITAN AREA CLINIC 52 | Facility: CLINIC | Age: 83
End: 2023-09-07
Payer: MEDICARE

## 2023-09-07 DIAGNOSIS — H40.1123 PRIMARY OPEN-ANGLE GLAUCOMA, LEFT EYE, SEVERE STAGE: Primary | ICD-10-CM

## 2023-09-07 PROCEDURE — 99213 OFFICE O/P EST LOW 20 MIN: CPT | Performed by: OPHTHALMOLOGY

## 2023-09-07 ASSESSMENT — INTRAOCULAR PRESSURE
OS: 12
OS: 13
OD: 13
OD: 12

## 2023-12-08 ENCOUNTER — OFFICE VISIT (OUTPATIENT)
Dept: URBAN - METROPOLITAN AREA CLINIC 52 | Facility: CLINIC | Age: 83
End: 2023-12-08
Payer: MEDICARE

## 2023-12-08 DIAGNOSIS — H10.823 ROSACEA CONJUNCTIVITIS, BILATERAL: ICD-10-CM

## 2023-12-08 DIAGNOSIS — H00.022 HORDEOLUM INTERNUM RIGHT LOWER EYELID: Primary | ICD-10-CM

## 2023-12-08 PROCEDURE — 99213 OFFICE O/P EST LOW 20 MIN: CPT | Performed by: OPTOMETRIST

## 2023-12-08 RX ORDER — ERYTHROMYCIN 5 MG/G
OINTMENT OPHTHALMIC
Qty: 3 | Refills: 0 | Status: ACTIVE
Start: 2023-12-08

## 2023-12-08 RX ORDER — CEPHALEXIN 500 MG/1
500 MG CAPSULE ORAL
Qty: 14 | Refills: 0 | Status: ACTIVE
Start: 2023-12-08

## 2023-12-08 ASSESSMENT — INTRAOCULAR PRESSURE
OD: 12
OS: 12

## 2023-12-14 ENCOUNTER — OFFICE VISIT (OUTPATIENT)
Dept: URBAN - METROPOLITAN AREA CLINIC 52 | Facility: CLINIC | Age: 83
End: 2023-12-14
Payer: MEDICARE

## 2023-12-14 DIAGNOSIS — H40.1123 PRIMARY OPEN-ANGLE GLAUCOMA, LEFT EYE, SEVERE STAGE: Primary | ICD-10-CM

## 2023-12-14 PROCEDURE — 99213 OFFICE O/P EST LOW 20 MIN: CPT | Performed by: OPHTHALMOLOGY

## 2023-12-14 ASSESSMENT — INTRAOCULAR PRESSURE
OS: 12
OD: 12

## 2024-01-03 ENCOUNTER — OFFICE VISIT (OUTPATIENT)
Dept: URBAN - METROPOLITAN AREA CLINIC 51 | Facility: CLINIC | Age: 84
End: 2024-01-03
Payer: MEDICARE

## 2024-01-03 DIAGNOSIS — H43.813 VITREOUS DEGENERATION, BILATERAL: ICD-10-CM

## 2024-01-03 DIAGNOSIS — H40.1223 LOW-TENSION GLAUCOMA, LEFT EYE, SEVERE STAGE: Primary | ICD-10-CM

## 2024-01-03 DIAGNOSIS — H40.1212 LOW-TENSION GLAUCOMA, RIGHT EYE, MODERATE STAGE: ICD-10-CM

## 2024-01-03 DIAGNOSIS — H04.123 DRY EYE SYNDROME OF BILATERAL LACRIMAL GLANDS: ICD-10-CM

## 2024-01-03 PROCEDURE — 92133 CPTRZD OPH DX IMG PST SGM ON: CPT | Performed by: STUDENT IN AN ORGANIZED HEALTH CARE EDUCATION/TRAINING PROGRAM

## 2024-01-03 PROCEDURE — 99213 OFFICE O/P EST LOW 20 MIN: CPT | Performed by: STUDENT IN AN ORGANIZED HEALTH CARE EDUCATION/TRAINING PROGRAM

## 2024-01-03 RX ORDER — LATANOPROSTENE BUNOD 0.24 MG/ML
0.024 % SOLUTION/ DROPS OPHTHALMIC
Qty: 5 | Refills: 5 | Status: ACTIVE
Start: 2024-01-03

## 2024-01-03 RX ORDER — NETARSUDIL 0.2 MG/ML
0.02 % SOLUTION/ DROPS OPHTHALMIC; TOPICAL
Qty: 2.5 | Refills: 5 | Status: ACTIVE
Start: 2024-01-03

## 2024-01-03 RX ORDER — DORZOLAMIDE HYDROCHLORIDE AND TIMOLOL MALEATE 20; 5 MG/ML; MG/ML
SOLUTION/ DROPS OPHTHALMIC
Qty: 10 | Refills: 5 | Status: ACTIVE
Start: 2024-01-03

## 2024-01-03 ASSESSMENT — INTRAOCULAR PRESSURE
OD: 12
OS: 12

## 2024-05-03 ENCOUNTER — OFFICE VISIT (OUTPATIENT)
Dept: URBAN - METROPOLITAN AREA CLINIC 51 | Facility: CLINIC | Age: 84
End: 2024-05-03
Payer: MEDICARE

## 2024-05-03 DIAGNOSIS — H40.1223 LOW-TENSION GLAUCOMA, LEFT EYE, SEVERE STAGE: Primary | ICD-10-CM

## 2024-05-03 DIAGNOSIS — H40.1212 LOW-TENSION GLAUCOMA, RIGHT EYE, MODERATE STAGE: ICD-10-CM

## 2024-05-03 PROCEDURE — 92083 EXTENDED VISUAL FIELD XM: CPT | Performed by: STUDENT IN AN ORGANIZED HEALTH CARE EDUCATION/TRAINING PROGRAM

## 2024-05-03 PROCEDURE — 99214 OFFICE O/P EST MOD 30 MIN: CPT | Performed by: STUDENT IN AN ORGANIZED HEALTH CARE EDUCATION/TRAINING PROGRAM

## 2024-05-03 RX ORDER — DORZOLAMIDE HYDROCHLORIDE AND TIMOLOL MALEATE 20; 5 MG/ML; MG/ML
SOLUTION/ DROPS OPHTHALMIC
Qty: 10 | Refills: 5 | Status: ACTIVE
Start: 2024-05-03

## 2024-05-03 RX ORDER — NETARSUDIL 0.2 MG/ML
0.02 % SOLUTION/ DROPS OPHTHALMIC; TOPICAL
Qty: 2.5 | Refills: 5 | Status: ACTIVE
Start: 2024-05-03

## 2024-05-03 RX ORDER — PREDNISOLONE ACETATE 10 MG/ML
1 % SUSPENSION/ DROPS OPHTHALMIC
Qty: 5 | Refills: 1 | Status: ACTIVE
Start: 2024-05-03

## 2024-05-03 RX ORDER — LATANOPROSTENE BUNOD 0.24 MG/ML
0.024 % SOLUTION/ DROPS OPHTHALMIC
Qty: 5 | Refills: 5 | Status: ACTIVE
Start: 2024-05-03

## 2024-05-03 ASSESSMENT — INTRAOCULAR PRESSURE
OD: 12
OS: 12

## 2024-06-13 ENCOUNTER — SURGERY (OUTPATIENT)
Dept: URBAN - METROPOLITAN AREA SURGERY 28 | Facility: LOCATION | Age: 84
End: 2024-06-13
Payer: MEDICARE

## 2024-06-13 PROCEDURE — 65855 TRABECULOPLASTY LASER SURG: CPT | Performed by: STUDENT IN AN ORGANIZED HEALTH CARE EDUCATION/TRAINING PROGRAM

## 2024-06-13 RX ORDER — PREDNISOLONE ACETATE 10 MG/ML
1 % SUSPENSION/ DROPS OPHTHALMIC
Qty: 5 | Refills: 1 | Status: ACTIVE
Start: 2024-06-13

## 2024-06-27 ENCOUNTER — SURGERY (OUTPATIENT)
Dept: URBAN - METROPOLITAN AREA SURGERY 28 | Facility: LOCATION | Age: 84
End: 2024-06-27
Payer: MEDICARE

## 2024-06-27 PROCEDURE — 65855 TRABECULOPLASTY LASER SURG: CPT | Performed by: STUDENT IN AN ORGANIZED HEALTH CARE EDUCATION/TRAINING PROGRAM

## 2024-06-27 RX ORDER — PREDNISOLONE ACETATE 10 MG/ML
1 % SUSPENSION/ DROPS OPHTHALMIC
Qty: 5 | Refills: 1 | Status: ACTIVE
Start: 2024-06-27